# Patient Record
Sex: FEMALE | Race: WHITE | NOT HISPANIC OR LATINO | Employment: OTHER | URBAN - METROPOLITAN AREA
[De-identification: names, ages, dates, MRNs, and addresses within clinical notes are randomized per-mention and may not be internally consistent; named-entity substitution may affect disease eponyms.]

---

## 2024-01-16 PROBLEM — J34.9 DISORDER OF NOSE: Status: ACTIVE | Noted: 2024-01-16

## 2024-01-16 PROBLEM — K11.7 XEROSTOMIA: Status: ACTIVE | Noted: 2024-01-16

## 2024-01-16 PROBLEM — J34.89 NASAL CRUSTING: Status: ACTIVE | Noted: 2024-01-16

## 2024-01-16 PROBLEM — H61.20 EXCESSIVE CERUMEN IN EAR CANAL: Status: ACTIVE | Noted: 2024-01-16

## 2024-01-16 PROBLEM — J34.89 NASAL DISCHARGE: Status: ACTIVE | Noted: 2024-01-16

## 2024-01-16 PROBLEM — Z86.79 HISTORY OF HYPERTENSION: Status: ACTIVE | Noted: 2024-01-16

## 2024-01-16 PROBLEM — K37 APPENDICITIS: Status: ACTIVE | Noted: 2018-07-22

## 2024-01-16 PROBLEM — H61.23 EXCESSIVE EAR WAX, BILATERAL: Status: ACTIVE | Noted: 2024-01-16

## 2024-01-16 PROBLEM — R42 VERTIGO: Status: ACTIVE | Noted: 2024-01-16

## 2024-01-16 PROBLEM — K57.90 DIVERTICULAR DISEASE: Status: ACTIVE | Noted: 2024-01-16

## 2024-01-16 PROBLEM — N60.11 FIBROCYSTIC BREAST CHANGES OF BOTH BREASTS: Status: ACTIVE | Noted: 2018-04-30

## 2024-01-16 PROBLEM — N60.12 FIBROCYSTIC BREAST CHANGES OF BOTH BREASTS: Status: ACTIVE | Noted: 2018-04-30

## 2024-01-16 PROBLEM — N60.12: Status: ACTIVE | Noted: 2024-01-16

## 2024-01-16 PROBLEM — H69.93 EUSTACHIAN TUBE DYSFUNCTION, BILATERAL: Status: ACTIVE | Noted: 2024-01-16

## 2024-01-16 PROBLEM — R09.81 NASAL CONGESTION: Status: ACTIVE | Noted: 2024-01-16

## 2024-01-16 PROBLEM — H90.3 SENSORINEURAL HEARING LOSS (SNHL) OF BOTH EARS: Status: ACTIVE | Noted: 2024-01-16

## 2024-01-16 PROBLEM — E07.9 DISORDER OF THYROID: Status: ACTIVE | Noted: 2024-01-16

## 2024-01-16 RX ORDER — CALCIUM CARBONATE/VITAMIN D3 600MG-5MCG
2 TABLET ORAL DAILY
COMMUNITY
End: 2024-04-23 | Stop reason: ALTCHOICE

## 2024-01-16 RX ORDER — FLUTICASONE PROPIONATE AND SALMETEROL 250; 50 UG/1; UG/1
1 POWDER RESPIRATORY (INHALATION)
COMMUNITY

## 2024-01-16 RX ORDER — ACETAMINOPHEN 500 MG
TABLET ORAL
COMMUNITY
Start: 2019-03-25

## 2024-01-16 RX ORDER — ACETAMINOPHEN 325 MG/1
2 TABLET ORAL EVERY 4 HOURS PRN
COMMUNITY
Start: 2018-07-25

## 2024-01-16 RX ORDER — ALBUTEROL SULFATE 90 UG/1
2 AEROSOL, METERED RESPIRATORY (INHALATION) EVERY 4 HOURS PRN
COMMUNITY
Start: 2021-11-27

## 2024-01-16 RX ORDER — MONTELUKAST SODIUM 10 MG/1
TABLET ORAL
COMMUNITY
Start: 2018-07-03

## 2024-01-16 RX ORDER — ASPIRIN 81 MG/1
TABLET ORAL
COMMUNITY
Start: 2019-03-25 | End: 2024-04-23 | Stop reason: ALTCHOICE

## 2024-01-16 RX ORDER — OXYBUTYNIN CHLORIDE 5 MG/1
5 TABLET ORAL 2 TIMES DAILY
COMMUNITY
Start: 2015-12-01

## 2024-01-16 RX ORDER — FLUTICASONE PROPIONATE 50 MCG
SPRAY, SUSPENSION (ML) NASAL
COMMUNITY
Start: 2017-06-01 | End: 2024-04-23 | Stop reason: ALTCHOICE

## 2024-01-16 RX ORDER — CITALOPRAM 20 MG/1
TABLET, FILM COATED ORAL
COMMUNITY
Start: 2015-12-23

## 2024-01-16 RX ORDER — LEVOTHYROXINE SODIUM 125 UG/1
TABLET ORAL
COMMUNITY
Start: 2015-12-01

## 2024-01-16 RX ORDER — AZELASTINE HYDROCHLORIDE 0.5 MG/ML
2 SOLUTION/ DROPS OPHTHALMIC 2 TIMES DAILY
COMMUNITY
Start: 2016-10-24

## 2024-01-16 RX ORDER — IPRATROPIUM BROMIDE 42 UG/1
SPRAY, METERED NASAL
COMMUNITY
Start: 2019-10-16 | End: 2024-04-23 | Stop reason: ALTCHOICE

## 2024-01-16 RX ORDER — CETIRIZINE HYDROCHLORIDE 10 MG/1
10 TABLET ORAL
COMMUNITY
Start: 2017-04-24 | End: 2024-04-23 | Stop reason: ALTCHOICE

## 2024-01-16 RX ORDER — HYDROCHLOROTHIAZIDE 12.5 MG/1
TABLET ORAL
COMMUNITY
Start: 2019-03-25

## 2024-01-16 RX ORDER — ENALAPRIL MALEATE AND HYDROCHLOROTHIAZIDE 10; 25 MG/1; MG/1
1 TABLET ORAL DAILY
COMMUNITY

## 2024-01-19 NOTE — PROGRESS NOTES
Subjective   Patient ID: Emily Murphy is a 73 y.o. female who presents for No chief complaint on file..  HPI  This 73-year-old female last seen in September 2023 has a history of sensorineural hearing loss, ear canal dryness and nasal dryness and crusting.  Asymmetric high-frequency hearing loss had been noted on past test.  She does have a history of arthritis and I had recommended to her she consider referral to rheumatology.  Because of the ear canal dryness lubrication of that region had also been recommended at least using external hand cream and periodically a drop of oil baby oil or sweet oil.  Review of Systems    Objective   Physical Exam  EXAMINATION:     GENERAL JASON.EARANCE: Alert, in no acute distress, normal pitch and clarity of voice, well-developed and nourished, cooperative.     HEAD/FACE: Normocephalic, atraumatic, normal facial movements and strength, no no tenderness to palpation, no lesions noted.     SKIN: Normal turgor, no raised or ulcerative lesions, warm and dry to palpation. Thinning hair     EYES: Extraocular motions intact, no nystagmus noted, pupils equal and reactive to light and accommodation, no conjunctivitis.     EARS: Both ears--no external lesions of the skin are noted. The ear canals are devoid of obstructive wax with some dry ceruminous debris and dry skin removed mostly from the left ear. Both tympanic members are normal in appearance there is no sign of middle ear disease.     NOSE: No external skin lesions are noted, nares are patent, nasal dryness is noted bilaterally. Septum is intact, sinuses are nontender to palpation bilaterally, no intranasal lesions or inflammation is noted, nasal valve is normal.     OROPHARYNX/ORAL CAVITY: Oropharynx is not inflamed and is without lesions, mucosa of the oral cavity is intact and without lesions, mucosal dryness is noted diffusely in the oral cavity and oropharynx, tongue is midline and mobile, no acute dental disease is noted, TMJs  are mobile     NECK: No lymphadenopathy is palpated, carotid pulses are intact, neck is supple with full range of motion, no thyroid abnormalities are noted, trachea is midline, no neck masses are palpated.     LYMPHATICS: No cervical adenopathy or supraclavicular adenopathy is palpated.     NEUROLOGIC/PSYCH; alert and oriented, cranial nerves are grossly intact, gait is without falling, no motor deficits are noted.  Assessment/Plan   {Assess/PlanSmartLinks:81996}         Caio Bartlett DMD, MD 01/19/24 1:48 PM

## 2024-01-23 ENCOUNTER — APPOINTMENT (OUTPATIENT)
Dept: OTOLARYNGOLOGY | Facility: CLINIC | Age: 74
End: 2024-01-23
Payer: MEDICARE

## 2024-04-15 PROBLEM — N81.11 MIDLINE CYSTOCELE: Status: ACTIVE | Noted: 2024-04-15

## 2024-04-15 PROBLEM — N95.2 ATROPHIC VAGINITIS: Status: ACTIVE | Noted: 2024-04-15

## 2024-04-15 PROBLEM — N39.41 URGE INCONTINENCE OF URINE: Status: ACTIVE | Noted: 2024-04-15

## 2024-04-15 PROBLEM — N99.3 PROLAPSE OF VAGINAL VAULT AFTER HYSTERECTOMY: Status: ACTIVE | Noted: 2024-04-15

## 2024-04-19 NOTE — PROGRESS NOTES
Subjective   Patient ID: Emily Murphy is a 73 y.o. female who presents for No chief complaint on file..  HPI  This 73-year-old female former patient of my former associate is being seen in the office today for recheck of her ears primarily. She has past history of high-frequency hearing loss based on audiogram from 2019. There is also associated tinnitus. She also has complaints of dry mouth dry ears , dry nasal passages. This has been an ongoing issue for her. There is been no infections in the head and neck region recently. Her past medical history is positive for gastroesophageal reflux disease allergies asthma and hypertension.  She does have ongoing issues with xerostomia and she was advised at her last visit to discuss a rheumatology evaluation by her PCP.  She previously had also been given advice regarding ear canal dryness and means of lubricating the skin using sweet oil or baby oil 1 to 2 days/week using an eyedrop to administer 1 to 2 drops in each canal.  She has not as of yet seen rheumatology for her dryness symptoms.  Review of Systems  A 12 point ROS has been reviewed and are negative for complaint except what is stated in the history of present illness and/or past medical history as noted in the EMR    Active Ambulatory Problems     Diagnosis Date Noted    Allergic rhinitis 04/18/2016    Allergic rhinitis due to animal hair and dander 06/20/2016    Allergic rhinitis due to house dust mite 06/20/2016    Allergic rhinitis due to pollen 06/20/2016    Appendicitis 07/22/2018    Bronchitis, mucopurulent recurrent (Multi) 05/09/2016    Disorder of thyroid 01/16/2024    Diverticular disease 01/16/2024    Ductal papillomatosis of breast, left 01/16/2024    Eustachian tube dysfunction, bilateral 01/16/2024    Excessive cerumen in ear canal 01/16/2024    Excessive ear wax, bilateral 01/16/2024    Fibrocystic breast 02/11/2016    Fibrocystic breast changes of both breasts 04/30/2018    Gastroesophageal  reflux disease 08/29/2016    History of hypertension 01/16/2024    Mild persistent asthma without complication ([facility]) 04/18/2016    Nasal congestion 01/16/2024    Disorder of nose 01/16/2024    Nasal crusting 01/16/2024    Nasal discharge 01/16/2024    Non morbid obesity 04/18/2016    ALBERTO treated with BiPAP 04/18/2016    Sensorineural hearing loss (SNHL) of both ears 01/16/2024    Vertigo 01/16/2024    Xerostomia 01/16/2024    Atrophic vaginitis 04/15/2024    Midline cystocele 04/15/2024    Prolapse of vaginal vault after hysterectomy 04/15/2024    Urge incontinence of urine 04/15/2024     Resolved Ambulatory Problems     Diagnosis Date Noted    No Resolved Ambulatory Problems     Past Medical History:   Diagnosis Date    Disorder of thyroid, unspecified     Other allergy status, other than to drugs and biological substances     Personal history of other diseases of the circulatory system     Personal history of other diseases of the digestive system     Personal history of other diseases of the digestive system     Personal history of other diseases of the digestive system     Personal history of other diseases of the respiratory system          Current Outpatient Medications:     acetaminophen (Tylenol) 325 mg tablet, Take 2 tablets (650 mg) by mouth every 4 hours if needed., Disp: , Rfl:     albuterol 90 mcg/actuation inhaler, Inhale 2 puffs every 4 hours if needed., Disp: , Rfl:     aspirin 81 mg EC tablet, Take by mouth., Disp: , Rfl:     azelastine (Optivar) 0.05 % ophthalmic solution, Administer 2 drops into affected eye(s) twice a day., Disp: , Rfl:     calcium carbonate-vitamin D3 600 mg-5 mcg (200 unit) tablet, Take 2 tablets by mouth once daily., Disp: , Rfl:     cetirizine (ZyrTEC) 10 mg tablet, Take 1 tablet (10 mg) by mouth once daily., Disp: , Rfl:     cholecalciferol (Vitamin D-3) 50 mcg (2,000 unit) capsule, Take by mouth., Disp: , Rfl:     citalopram (CeleXA) 20 mg tablet, Take by mouth., Disp:  , Rfl:     enalapriL-hydrochlorothiazide (Vasoretic) 10-25 mg tablet, Take 1 tablet by mouth once daily., Disp: , Rfl:     fluticasone (Flonase) 50 mcg/actuation nasal spray, Administer into affected nostril(s)., Disp: , Rfl:     fluticasone propion-salmeteroL (Advair Diskus) 250-50 mcg/dose diskus inhaler, Inhale 1 puff once daily., Disp: , Rfl:     hydroCHLOROthiazide (HYDRODiuril) 12.5 mg tablet, Take by mouth., Disp: , Rfl:     ipratropium (Atrovent) 42 mcg (0.06 %) nasal spray, Administer into affected nostril(s)., Disp: , Rfl:     levothyroxine (Synthroid) 125 mcg tablet, Take by mouth., Disp: , Rfl:     linaCLOtide (Linzess) 290 mcg capsule, Take 1 capsule (290 mcg) by mouth once daily in the morning. Take before meals., Disp: , Rfl:     montelukast (Singulair) 10 mg tablet, Take by mouth., Disp: , Rfl:     oxybutynin (Ditropan) 5 mg tablet, Take 1 tablet (5 mg) by mouth twice a day., Disp: , Rfl:    Allergies   Allergen Reactions    Levalbuterol Other     Other reaction(s): fainting/dizziness   Fainting and dizziness    Fainting and dizziness    Oxycodone-Acetaminophen Hallucinations and Other     Hallucinations, dizziness - per patient    Oxycodone Unknown    Tramadol Nausea Only, Other and Unknown     and dizzy   Nervous, shaking    Nervous, shaking    and dizzy        Social History     Tobacco Use    Smoking status: Not on file    Smokeless tobacco: Not on file   Substance Use Topics    Alcohol use: Not on file      Objective   Physical Exam  EXAMINATION:     GENERAL JASON.EARANCE: Alert, in no acute distress, normal pitch and clarity of voice, well-developed and nourished, cooperative.     HEAD/FACE: Normocephalic, atraumatic, normal facial movements and strength, no no tenderness to palpation, no lesions noted.     SKIN: Normal turgor, no raised or ulcerative lesions, warm and dry to palpation. Thinning hair     EYES: Extraocular motions intact, no nystagmus noted, pupils equal and reactive to light and  accommodation, no conjunctivitis.     EARS: Both ears--no external lesions of the skin are noted. The ear canals are devoid of obstructive wax with some dry ceruminous debris and dry skin removed mostly from the left ear. Both tympanic members are normal in appearance there is no sign of middle ear disease.     NOSE: No external skin lesions are noted, nares are patent, nasal dryness is noted bilaterally. Septum is intact, sinuses are nontender to palpation bilaterally, no intranasal lesions or inflammation is noted, nasal valve is normal.     OROPHARYNX/ORAL CAVITY: Oropharynx is not inflamed and is without lesions, mucosa of the oral cavity is intact and without lesions, mucosal dryness is noted diffusely in the oral cavity and oropharynx, tongue is midline and mobile, no acute dental disease is noted, TMJs are mobile     NECK: No lymphadenopathy is palpated, carotid pulses are intact, neck is supple with full range of motion, no thyroid abnormalities are noted, trachea is midline, no neck masses are palpated.     LYMPHATICS: No cervical adenopathy or supraclavicular adenopathy is palpated.     NEUROLOGIC/PSYCH; alert and oriented, cranial nerves are grossly intact, gait is without falling, no motor deficits are noted.    Patient ID: Emily Murphy is a 73 y.o. female.    Ear cerumen removal    Date/Time: 4/23/2024 4:28 PM    Performed by: Caio Bartlett DMD, MD  Authorized by: Caio Bartlett DMD, MD    Consent:     Consent obtained:  Verbal    Consent given by:  Patient    Risks discussed:  Pain and incomplete removal    Alternatives discussed:  No treatment and alternative treatment  Universal protocol:     Procedure explained and questions answered to patient or proxy's satisfaction: yes      Imaging studies available: no      Required blood products, implants, devices, and special equipment available: no      Patient identity confirmed:  Verbally with patient  Procedure details:     Location:  L ear and  R ear    Procedure type: curette      Procedure type comment:  Or suction    Procedure outcomes: cerumen removed    Post-procedure details:     Inspection:  No bleeding and ear canal clear    Hearing quality:  Improved    Procedure completion:  Tolerated well, no immediate complications       Assessment/Plan   Problem List Items Addressed This Visit             ICD-10-CM    Allergic rhinitis J30.9    Excessive ear wax, bilateral H61.23    Nasal congestion R09.81    Sensorineural hearing loss (SNHL) of both ears H90.3    Xerostomia - Primary K11.7     I discussed the clinical finds with the patient.  Her exam today did not show any evidence for inflammation in regards to the upper airway or over logically.  She does have ear canal dryness which she can be helped by a drop of baby oil periodically in the ear canal along with hand cream.  She should keep water out of her ears and avoid Q-tip use.  She does have symptoms of dry eye and dry mouth although there is been no complaints regarding arthritis and has been no major salivary gland swelling.  She can use artificial tears to help with her eyes and she needs to stay well-hydrated and use artificial supplements if necessary for saliva replacement.  If she is having other symptoms she should see rheumatology for an evaluation into autoimmune issues.  From a hearing standpoint she had an audiogram done in the fall 2023 and that would be scheduled again for her and her 6-month follow-up.    This patient is advised to follow up with their PCP for all other health care issues and treatment. Dictation was done with dragon transcription and errors in spelling  and diction are possible.       Caio Bartlett DMD, MD 04/19/24 12:15 PM

## 2024-04-23 ENCOUNTER — OFFICE VISIT (OUTPATIENT)
Dept: OTOLARYNGOLOGY | Facility: CLINIC | Age: 74
End: 2024-04-23
Payer: MEDICARE

## 2024-04-23 VITALS — BODY MASS INDEX: 28.93 KG/M2 | HEIGHT: 66 IN | WEIGHT: 180 LBS

## 2024-04-23 DIAGNOSIS — K11.7 XEROSTOMIA: Primary | ICD-10-CM

## 2024-04-23 DIAGNOSIS — R09.81 NASAL CONGESTION: ICD-10-CM

## 2024-04-23 DIAGNOSIS — H61.23 EXCESSIVE EAR WAX, BILATERAL: ICD-10-CM

## 2024-04-23 DIAGNOSIS — J30.9 ALLERGIC RHINITIS, UNSPECIFIED SEASONALITY, UNSPECIFIED TRIGGER: ICD-10-CM

## 2024-04-23 DIAGNOSIS — H90.3 SENSORINEURAL HEARING LOSS (SNHL) OF BOTH EARS: ICD-10-CM

## 2024-04-23 PROCEDURE — 1160F RVW MEDS BY RX/DR IN RCRD: CPT | Performed by: OTOLARYNGOLOGY

## 2024-04-23 PROCEDURE — 99213 OFFICE O/P EST LOW 20 MIN: CPT | Performed by: OTOLARYNGOLOGY

## 2024-04-23 PROCEDURE — 1036F TOBACCO NON-USER: CPT | Performed by: OTOLARYNGOLOGY

## 2024-04-23 PROCEDURE — 69210 REMOVE IMPACTED EAR WAX UNI: CPT | Performed by: OTOLARYNGOLOGY

## 2024-04-23 PROCEDURE — 1159F MED LIST DOCD IN RCRD: CPT | Performed by: OTOLARYNGOLOGY

## 2024-04-23 RX ORDER — IBUPROFEN 800 MG/1
800 TABLET ORAL EVERY 6 HOURS PRN
COMMUNITY
Start: 2024-03-28

## 2024-04-23 RX ORDER — ONDANSETRON 4 MG/1
8 TABLET, FILM COATED ORAL EVERY 8 HOURS PRN
COMMUNITY
Start: 2024-03-21

## 2024-04-23 RX ORDER — LIOTHYRONINE SODIUM 5 UG/1
5 TABLET ORAL DAILY
COMMUNITY
Start: 2024-02-08

## 2024-04-23 RX ORDER — AMOXICILLIN 500 MG/1
500 CAPSULE ORAL EVERY 8 HOURS SCHEDULED
COMMUNITY
Start: 2024-03-21

## 2024-10-23 ENCOUNTER — APPOINTMENT (OUTPATIENT)
Dept: OTOLARYNGOLOGY | Facility: CLINIC | Age: 74
End: 2024-10-23
Payer: MEDICARE

## 2024-11-05 ENCOUNTER — APPOINTMENT (OUTPATIENT)
Dept: OTOLARYNGOLOGY | Facility: CLINIC | Age: 74
End: 2024-11-05
Payer: MEDICARE